# Patient Record
Sex: MALE | Race: WHITE | NOT HISPANIC OR LATINO | Employment: FULL TIME | URBAN - METROPOLITAN AREA
[De-identification: names, ages, dates, MRNs, and addresses within clinical notes are randomized per-mention and may not be internally consistent; named-entity substitution may affect disease eponyms.]

---

## 2017-10-05 ENCOUNTER — HOSPITAL ENCOUNTER (OUTPATIENT)
Dept: RADIOLOGY | Facility: MEDICAL CENTER | Age: 57
End: 2017-10-05
Attending: PHYSICAL MEDICINE & REHABILITATION
Payer: MEDICAID

## 2017-10-05 DIAGNOSIS — M54.16 LUMBAR RADICULOPATHY: ICD-10-CM

## 2017-10-05 DIAGNOSIS — M51.36 DEGENERATION OF LUMBAR INTERVERTEBRAL DISC: ICD-10-CM

## 2017-10-05 PROCEDURE — 93971 EXTREMITY STUDY: CPT | Mod: RT

## 2017-11-14 ENCOUNTER — HOSPITAL ENCOUNTER (OUTPATIENT)
Dept: LAB | Facility: MEDICAL CENTER | Age: 57
End: 2017-11-14
Attending: PHYSICIAN ASSISTANT
Payer: MEDICAID

## 2017-11-14 LAB
ALBUMIN SERPL BCP-MCNC: 4.2 G/DL (ref 3.2–4.9)
ALBUMIN/GLOB SERPL: 1.3 G/DL
ALP SERPL-CCNC: 55 U/L (ref 30–99)
ALT SERPL-CCNC: 28 U/L (ref 2–50)
ANION GAP SERPL CALC-SCNC: 6 MMOL/L (ref 0–11.9)
AST SERPL-CCNC: 14 U/L (ref 12–45)
BILIRUB SERPL-MCNC: 0.5 MG/DL (ref 0.1–1.5)
BUN SERPL-MCNC: 18 MG/DL (ref 8–22)
CALCIUM SERPL-MCNC: 8.9 MG/DL (ref 8.5–10.5)
CHLORIDE SERPL-SCNC: 101 MMOL/L (ref 96–112)
CHOLEST SERPL-MCNC: 154 MG/DL (ref 100–199)
CO2 SERPL-SCNC: 29 MMOL/L (ref 20–33)
CREAT SERPL-MCNC: 0.94 MG/DL (ref 0.5–1.4)
GFR SERPL CREATININE-BSD FRML MDRD: >60 ML/MIN/1.73 M 2
GLOBULIN SER CALC-MCNC: 3.3 G/DL (ref 1.9–3.5)
GLUCOSE SERPL-MCNC: 86 MG/DL (ref 65–99)
HDLC SERPL-MCNC: 52 MG/DL
LDLC SERPL CALC-MCNC: 69 MG/DL
POTASSIUM SERPL-SCNC: 4.4 MMOL/L (ref 3.6–5.5)
PROT SERPL-MCNC: 7.5 G/DL (ref 6–8.2)
SODIUM SERPL-SCNC: 136 MMOL/L (ref 135–145)
TRIGL SERPL-MCNC: 163 MG/DL (ref 0–149)
TSH SERPL DL<=0.005 MIU/L-ACNC: 2.87 UIU/ML (ref 0.3–3.7)

## 2017-11-14 PROCEDURE — 80053 COMPREHEN METABOLIC PANEL: CPT

## 2017-11-14 PROCEDURE — 36415 COLL VENOUS BLD VENIPUNCTURE: CPT

## 2017-11-14 PROCEDURE — 80061 LIPID PANEL: CPT

## 2017-11-14 PROCEDURE — 84443 ASSAY THYROID STIM HORMONE: CPT

## 2020-01-18 ENCOUNTER — HOSPITAL ENCOUNTER (EMERGENCY)
Facility: MEDICAL CENTER | Age: 60
End: 2020-01-18
Attending: EMERGENCY MEDICINE
Payer: MEDICARE

## 2020-01-18 ENCOUNTER — APPOINTMENT (OUTPATIENT)
Dept: RADIOLOGY | Facility: MEDICAL CENTER | Age: 60
End: 2020-01-18
Attending: EMERGENCY MEDICINE
Payer: MEDICARE

## 2020-01-18 ENCOUNTER — APPOINTMENT (OUTPATIENT)
Dept: RADIOLOGY | Facility: MEDICAL CENTER | Age: 60
End: 2020-01-18
Payer: MEDICARE

## 2020-01-18 VITALS
RESPIRATION RATE: 14 BRPM | HEART RATE: 59 BPM | DIASTOLIC BLOOD PRESSURE: 51 MMHG | HEIGHT: 71 IN | OXYGEN SATURATION: 90 % | BODY MASS INDEX: 44.1 KG/M2 | WEIGHT: 315 LBS | TEMPERATURE: 98.4 F | SYSTOLIC BLOOD PRESSURE: 123 MMHG

## 2020-01-18 DIAGNOSIS — F10.920 ALCOHOLIC INTOXICATION WITHOUT COMPLICATION (HCC): ICD-10-CM

## 2020-01-18 DIAGNOSIS — M54.50 CHRONIC BILATERAL LOW BACK PAIN WITHOUT SCIATICA: ICD-10-CM

## 2020-01-18 DIAGNOSIS — G89.29 CHRONIC BILATERAL LOW BACK PAIN WITHOUT SCIATICA: ICD-10-CM

## 2020-01-18 DIAGNOSIS — I95.1 ORTHOSTATIC SYNCOPE: ICD-10-CM

## 2020-01-18 LAB
ANION GAP SERPL CALC-SCNC: 9 MMOL/L (ref 0–11.9)
BASOPHILS # BLD AUTO: 1.1 % (ref 0–1.8)
BASOPHILS # BLD: 0.08 K/UL (ref 0–0.12)
BUN SERPL-MCNC: 14 MG/DL (ref 8–22)
CALCIUM SERPL-MCNC: 8.4 MG/DL (ref 8.5–10.5)
CHLORIDE SERPL-SCNC: 105 MMOL/L (ref 96–112)
CO2 SERPL-SCNC: 29 MMOL/L (ref 20–33)
CREAT SERPL-MCNC: 0.93 MG/DL (ref 0.5–1.4)
EKG IMPRESSION: NORMAL
EOSINOPHIL # BLD AUTO: 1.15 K/UL (ref 0–0.51)
EOSINOPHIL NFR BLD: 16.4 % (ref 0–6.9)
ERYTHROCYTE [DISTWIDTH] IN BLOOD BY AUTOMATED COUNT: 49.1 FL (ref 35.9–50)
ETHANOL BLD-MCNC: 0.14 G/DL
GLUCOSE SERPL-MCNC: 88 MG/DL (ref 65–99)
HCT VFR BLD AUTO: 43 % (ref 42–52)
HGB BLD-MCNC: 14.1 G/DL (ref 14–18)
IMM GRANULOCYTES # BLD AUTO: 0.02 K/UL (ref 0–0.11)
IMM GRANULOCYTES NFR BLD AUTO: 0.3 % (ref 0–0.9)
LYMPHOCYTES # BLD AUTO: 2.19 K/UL (ref 1–4.8)
LYMPHOCYTES NFR BLD: 31.3 % (ref 22–41)
MCH RBC QN AUTO: 31 PG (ref 27–33)
MCHC RBC AUTO-ENTMCNC: 32.8 G/DL (ref 33.7–35.3)
MCV RBC AUTO: 94.5 FL (ref 81.4–97.8)
MONOCYTES # BLD AUTO: 0.77 K/UL (ref 0–0.85)
MONOCYTES NFR BLD AUTO: 11 % (ref 0–13.4)
NEUTROPHILS # BLD AUTO: 2.79 K/UL (ref 1.82–7.42)
NEUTROPHILS NFR BLD: 39.9 % (ref 44–72)
NRBC # BLD AUTO: 0 K/UL
NRBC BLD-RTO: 0 /100 WBC
PLATELET # BLD AUTO: 99 K/UL (ref 164–446)
PMV BLD AUTO: 10.1 FL (ref 9–12.9)
POTASSIUM SERPL-SCNC: 3.4 MMOL/L (ref 3.6–5.5)
RBC # BLD AUTO: 4.55 M/UL (ref 4.7–6.1)
SODIUM SERPL-SCNC: 143 MMOL/L (ref 135–145)
WBC # BLD AUTO: 7 K/UL (ref 4.8–10.8)

## 2020-01-18 PROCEDURE — 93005 ELECTROCARDIOGRAM TRACING: CPT | Performed by: EMERGENCY MEDICINE

## 2020-01-18 PROCEDURE — 80048 BASIC METABOLIC PNL TOTAL CA: CPT

## 2020-01-18 PROCEDURE — 70450 CT HEAD/BRAIN W/O DYE: CPT

## 2020-01-18 PROCEDURE — 36415 COLL VENOUS BLD VENIPUNCTURE: CPT

## 2020-01-18 PROCEDURE — 72125 CT NECK SPINE W/O DYE: CPT

## 2020-01-18 PROCEDURE — 85025 COMPLETE CBC W/AUTO DIFF WBC: CPT

## 2020-01-18 PROCEDURE — 72128 CT CHEST SPINE W/O DYE: CPT

## 2020-01-18 PROCEDURE — 99285 EMERGENCY DEPT VISIT HI MDM: CPT

## 2020-01-18 PROCEDURE — 71045 X-RAY EXAM CHEST 1 VIEW: CPT

## 2020-01-18 PROCEDURE — 80307 DRUG TEST PRSMV CHEM ANLYZR: CPT

## 2020-01-18 PROCEDURE — 93005 ELECTROCARDIOGRAM TRACING: CPT

## 2020-01-18 PROCEDURE — 72131 CT LUMBAR SPINE W/O DYE: CPT

## 2020-01-19 NOTE — ED TRIAGE NOTES
Pt stood up to go to the bathroom and then remembers being on the floor with back pain. Pt states he hit his lower back and head on a coffee table. Ems states pt received 200mcg fentynal and 2mg versed. Pt presents skin pink warm and dry no acute resp distress and no chest pain. Pt reports lower back pain and slight pain in the back of his head. No obvious bleeding noted at present time.

## 2020-01-19 NOTE — ED NOTES
Pt able to ambulate with no issues. Pt did not report dizziness and states that he feels alright walking.

## 2020-01-19 NOTE — ED PROVIDER NOTES
ED Provider Note    Scribed for Jason Phillips M.D. by Nadira Chahal. 1/18/2020, 8:02 PM.    Primary care provider: None reported  Means of arrival: EMS  History obtained from: Patient  History limited by: None    CHIEF COMPLAINT  Chief Complaint   Patient presents with   • Fall   • Back Pain   • Syncope       [facility]  Linus Gallegos is a 59 y.o. male with HTN, chronic pain, COPD requiring 4LPM oxygen at all times and dyslipidemia who presents to the Emergency Department with a fall after standing up to urinate. He had been sitting on a couch when he got up, felt dizzy, and then woke up on the ground. He endorses drinking a pint of whiskey prior to the event, which he does not do daily. He feels his medications and drinking alcohol may have exacerbated his dizziness and fall. He is complaining of low back pain that may have been exacerbated with the fall. He has asociated pain to the back of his head and left shoulder. EMS gave the patient 200 mcg of Fentanyl and 2mg Versed en route which improved his pain. He denies neck pain, numbness/tingling, chest pain, or palpitations. He denies taking any blood thinners or opioid pain medications at this time but does take Gabapentin and NSAIDs for his chronic pain. He is supposed to be on Oxygen all of the time but he says he doesn't wear it at all times.       REVIEW OF SYSTEMS  See HPI for further details. All other systems are negative.     PAST MEDICAL HISTORY   has a past medical history of Angina, Backpain, CHF (congestive heart failure) (HCC), COPD (chronic obstructive pulmonary disease) (HCC), Fall, Hypercholesteremia, Hypertension, Indigestion, and Unspecified urinary incontinence.    SURGICAL HISTORY   has a past surgical history that includes other.    SOCIAL HISTORY  Social History     Tobacco Use   • Smoking status: Current Every Day Smoker     Packs/day: 1.00     Types: Cigarettes   • Tobacco comment: pk per day   Substance Use Topics   • Alcohol use: Yes      "Comment: 1 pt whiskey and 2 beers per day   • Drug use: No      Social History     Substance and Sexual Activity   Drug Use No     He lives in a tent with a few other individuals.    FAMILY HISTORY  History reviewed. No pertinent family history.    CURRENT MEDICATIONS  Reviewed.  See Encounter Summary.     ALLERGIES  Allergies   Allergen Reactions   • Codeine Vomiting     Reaction of last dose remains unclear.       PHYSICAL EXAM  VITAL SIGNS: /64   Pulse 75   Temp 36.9 °C (98.4 °F) (Oral)   Resp 14   Ht 1.803 m (5' 11\")   Wt (!) 158.3 kg (349 lb)   SpO2 (!) 87%   BMI 48.68 kg/m²   Constitutional: Alert, obese male laying supine on the gurney  HENT: No signs of trauma, Bilateral external ears normal, Nose normal.   Eyes: Pupils are equal and reactive, Conjunctiva normal, Non-icteric.   Neck: Normal range of motion, No tenderness, Supple, No stridor.   Cardiovascular: Regular rate and rhythm, no murmurs.   Thorax & Lungs: Normal breath sounds, No respiratory distress, No wheezing, No chest tenderness.   Abdomen: Bowel sounds normal, Soft, No tenderness, No masses, No pulsatile masses. No peritoneal signs.  Skin: Warm, Dry, No erythema, No rash.   Back: Diffuse cervical and thoracic spine tenderness  Extremities: Intact distal pulses, No edema, No tenderness, No cyanosis  Musculoskeletal: Good range of motion in all major joints. No tenderness to palpation or major deformities noted.   Neurologic: Alert , Normal motor function, Normal sensory function, No focal deficits noted. Finger to nose normal, heel to shin normal, cerebellar intact, cranial nerves II-XII intact, strength 5/5 and equal bilaterally, sensation intact throughout, cooperative, appropriate   Psychiatric: Affect normal, Judgment normal, Mood normal.     DIAGNOSTIC STUDIES / PROCEDURES     LABS  Results for orders placed or performed during the hospital encounter of 01/18/20   CBC WITH DIFFERENTIAL   Result Value Ref Range    WBC 7.0 4.8 - " 10.8 K/uL    RBC 4.55 (L) 4.70 - 6.10 M/uL    Hemoglobin 14.1 14.0 - 18.0 g/dL    Hematocrit 43.0 42.0 - 52.0 %    MCV 94.5 81.4 - 97.8 fL    MCH 31.0 27.0 - 33.0 pg    MCHC 32.8 (L) 33.7 - 35.3 g/dL    RDW 49.1 35.9 - 50.0 fL    Platelet Count 99 (L) 164 - 446 K/uL    MPV 10.1 9.0 - 12.9 fL    Neutrophils-Polys 39.90 (L) 44.00 - 72.00 %    Lymphocytes 31.30 22.00 - 41.00 %    Monocytes 11.00 0.00 - 13.40 %    Eosinophils 16.40 (H) 0.00 - 6.90 %    Basophils 1.10 0.00 - 1.80 %    Immature Granulocytes 0.30 0.00 - 0.90 %    Nucleated RBC 0.00 /100 WBC    Neutrophils (Absolute) 2.79 1.82 - 7.42 K/uL    Lymphs (Absolute) 2.19 1.00 - 4.80 K/uL    Monos (Absolute) 0.77 0.00 - 0.85 K/uL    Eos (Absolute) 1.15 (H) 0.00 - 0.51 K/uL    Baso (Absolute) 0.08 0.00 - 0.12 K/uL    Immature Granulocytes (abs) 0.02 0.00 - 0.11 K/uL    NRBC (Absolute) 0.00 K/uL   BASIC METABOLIC PANEL   Result Value Ref Range    Sodium 143 135 - 145 mmol/L    Potassium 3.4 (L) 3.6 - 5.5 mmol/L    Chloride 105 96 - 112 mmol/L    Co2 29 20 - 33 mmol/L    Glucose 88 65 - 99 mg/dL    Bun 14 8 - 22 mg/dL    Creatinine 0.93 0.50 - 1.40 mg/dL    Calcium 8.4 (L) 8.5 - 10.5 mg/dL    Anion Gap 9.0 0.0 - 11.9   DIAGNOSTIC ALCOHOL   Result Value Ref Range    Diagnostic Alcohol 0.14 (H) 0.00 g/dL   ESTIMATED GFR   Result Value Ref Range    GFR If African American >60 >60 mL/min/1.73 m 2    GFR If Non African American >60 >60 mL/min/1.73 m 2   EKG   Result Value Ref Range    Report       [facility]    Test Date:  2020  Pt Name:    ELICEO LEMA                  Department: ER  MRN:        [id number]                      Room:       [facility]  Gender:     Male                         Technician: [id number]  :        1960                   Requested By:ER TRIAGE PROTOCOL  Order #:    [id number]                    Reading MD:    Measurements  Intervals                                Axis  Rate:       62                            P:          -49  MN:         164                          QRS:        78  QRSD:       110                          T:          71  QT:         436  QTc:        443    Interpretive Statements  SINUS OR ECTOPIC ATRIAL RHYTHM  INCOMPLETE RIGHT BUNDLE BRANCH BLOCK  Compared to ECG 03/14/2016 02:04:07  Ectopic atrial rhythm now present  Incomplete right bundle-branch block now present  Sinus rhythm no longer present  Right ventricular hypertrophy no longer present         All labs were reviewed by me.    EKG  Sinus rhythm at 63 beats per minute with normal axis, incomplete rbbb ectopic atrial rhythm slight arrhythmia, no acute ST segment changes baseline artifact in inferior leads    RADIOLOGY  DX-CHEST-PORTABLE (1 VIEW)   Final Result      Left basilar atelectasis versus consolidation. Pneumonia can be considered in the appropriate clinical setting.      CT-TSPINE W/O PLUS RECONS   Final Result      No acute fracture or listhesis in the thoracic spine.      CT-LSPINE W/O PLUS RECONS   Final Result      No acute fracture or listhesis in the lumbar spine.      CT-HEAD W/O   Final Result   Addendum 1 of 1   There is a small right mastoid effusion.      Final      CT-CSPINE WITHOUT PLUS RECONS   Final Result      No acute fracture or listhesis in the cervical spine.        The radiologist's interpretation of all radiological studies and images have been reviewed by me.    COURSE & MEDICAL DECISION MAKING  Pertinent Labs & Imaging studies reviewed. (See chart for details)    8:02 PM - Patient seen and examined at bedside. Ordered EKG to evaluate his symptoms.     10:03 OM - Patient was reevaluated at bedside, he has improved. Discussed lab and radiology results with the patient and informed them that there doesn't appear to be any new injuries from the fall. The plan is to walk the patient and then discharge him home. The patient understood and was agreeable with the plan. They will follow up with their primary care  physician or return to the ED with any changes.       Decision Making:  This is a 59 y.o. year old male who presents with above complaint.  Patient states that he lives in a tent behind another residence.  He resides with another occupant in the tent as well.  Tonight was standing up off of the couch after drinking a significant amount of alcohol and then fell down.  Questionable syncope.  Sounds more orthostatic by history.  Patient has chronic back pain and this fall exacerbated this.  Imaging tonight did not show any acute abnormalities there.  Patient now feeling significant better and ambulatory without difficulty.  He will be discharged home with outpatient care and follow-up.      DISPOSITION:  Patient will be discharged home in stable condition.    FOLLOW UP:  [facility]  [address] [address] [phone]  Schedule an appointment as soon as possible for a visit in 1 week      [facility], Emergency Dept  [address] [address] [phone]    As needed      FINAL IMPRESSION  1. Orthostatic syncope    2. Alcoholic intoxication without complication (HCC)    3. Chronic bilateral low back pain without sciatica          Nadira NORIEGA (Scribe), am scribing for, and in the presence of, Jason Phillips M.D..    Electronically signed by: Nadira Chahal (Scribe), 1/18/2020    IJason M.D. personally performed the services described in this documentation, as scribed by Nadira Chahal in my presence, and it is both accurate and complete.    C    The note accurately reflects work and decisions made by me.  Jason Phillips M.D.  1/19/2020  4:45 AM

## 2021-03-15 DIAGNOSIS — Z23 NEED FOR VACCINATION: ICD-10-CM

## 2024-01-31 PROBLEM — J44.9 CHRONIC OBSTRUCTIVE PULMONARY DISEASE (HCC): Status: ACTIVE | Noted: 2024-01-31

## 2024-01-31 PROBLEM — J41.8 MIXED SIMPLE AND MUCOPURULENT CHRONIC BRONCHITIS (HCC): Status: ACTIVE | Noted: 2024-01-31

## 2024-01-31 PROBLEM — E66.2 EXTREME OBESITY WITH ALVEOLAR HYPOVENTILATION (HCC): Status: ACTIVE | Noted: 2024-01-31

## 2024-01-31 PROBLEM — G47.33 OBSTRUCTIVE SLEEP APNEA SYNDROME: Status: ACTIVE | Noted: 2024-01-31

## 2024-01-31 PROBLEM — I27.21 HYPERTENSIVE PULMONARY ARTERIAL DISEASE (HCC): Status: ACTIVE | Noted: 2024-01-31
